# Patient Record
Sex: FEMALE | Race: WHITE
[De-identification: names, ages, dates, MRNs, and addresses within clinical notes are randomized per-mention and may not be internally consistent; named-entity substitution may affect disease eponyms.]

---

## 2019-06-13 ENCOUNTER — HOSPITAL ENCOUNTER (OUTPATIENT)
Dept: HOSPITAL 8 - CFH | Age: 59
Discharge: HOME | End: 2019-06-13
Attending: INTERNAL MEDICINE
Payer: OTHER GOVERNMENT

## 2019-06-13 DIAGNOSIS — I10: ICD-10-CM

## 2019-06-13 DIAGNOSIS — E78.5: ICD-10-CM

## 2019-06-13 DIAGNOSIS — I25.10: ICD-10-CM

## 2019-06-13 DIAGNOSIS — I07.1: Primary | ICD-10-CM

## 2019-06-13 PROCEDURE — 93306 TTE W/DOPPLER COMPLETE: CPT

## 2019-06-13 PROCEDURE — A9502 TC99M TETROFOSMIN: HCPCS

## 2019-06-13 PROCEDURE — 78452 HT MUSCLE IMAGE SPECT MULT: CPT

## 2019-06-13 PROCEDURE — 93017 CV STRESS TEST TRACING ONLY: CPT

## 2021-02-11 ENCOUNTER — HOSPITAL ENCOUNTER (OUTPATIENT)
Dept: RADIOLOGY | Facility: MEDICAL CENTER | Age: 61
End: 2021-02-11
Payer: OTHER GOVERNMENT

## 2021-02-24 ENCOUNTER — OFFICE VISIT (OUTPATIENT)
Dept: VASCULAR LAB | Facility: MEDICAL CENTER | Age: 61
End: 2021-02-24
Attending: INTERNAL MEDICINE
Payer: OTHER GOVERNMENT

## 2021-02-24 VITALS
DIASTOLIC BLOOD PRESSURE: 59 MMHG | HEIGHT: 64 IN | WEIGHT: 120.5 LBS | SYSTOLIC BLOOD PRESSURE: 99 MMHG | BODY MASS INDEX: 20.57 KG/M2 | HEART RATE: 59 BPM

## 2021-02-24 DIAGNOSIS — E78.41 ELEVATED LIPOPROTEIN(A): ICD-10-CM

## 2021-02-24 DIAGNOSIS — I25.83 CORONARY ARTERY DISEASE DUE TO LIPID RICH PLAQUE: ICD-10-CM

## 2021-02-24 DIAGNOSIS — I25.10 CORONARY ARTERY DISEASE DUE TO LIPID RICH PLAQUE: ICD-10-CM

## 2021-02-24 DIAGNOSIS — E78.5 HYPERLIPIDEMIA, UNSPECIFIED HYPERLIPIDEMIA TYPE: ICD-10-CM

## 2021-02-24 DIAGNOSIS — E78.5 HYPERLIPIDEMIA: ICD-10-CM

## 2021-02-24 DIAGNOSIS — J94.0 BILATERAL CHYLOTHORAX: ICD-10-CM

## 2021-02-24 DIAGNOSIS — R73.01 IFG (IMPAIRED FASTING GLUCOSE): ICD-10-CM

## 2021-02-24 PROCEDURE — 99213 OFFICE O/P EST LOW 20 MIN: CPT

## 2021-02-24 PROCEDURE — 99205 OFFICE O/P NEW HI 60 MIN: CPT | Performed by: INTERNAL MEDICINE

## 2021-02-24 RX ORDER — NIACIN 500 MG/1
2000 TABLET, EXTENDED RELEASE ORAL DAILY
Qty: 90 TABLET | Refills: 3
Start: 2021-02-24

## 2021-02-24 RX ORDER — EMPAGLIFLOZIN 10 MG/1
TABLET, FILM COATED ORAL
COMMUNITY

## 2021-02-25 NOTE — PROGRESS NOTES
INITIAL VASCULAR VISIT  Subjective:   Zoraida Hoover is a 60 y.o. 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  Has h/o dyslipidemia and htn,  Has had very high lp(a) discovered after MI -has a documented is high 600  Has been on repatha since may 2020  Takes niacin and estrogen  Also on rosuvastatin and ezetimibe  LDL-C has been very well controlled -almost undetectable  No history of hypertriglyceridemia  She had initially presented with some back pain and some shortness of breath.  She ended up needing a chest tube and having liters of fluid taken out of her thorax.  The consistency was consistent with chylothorax.  She did have mild elevations in lipase  No other cause of chylothorax was identified  Her blood pressures been under excellent control  Mother and sister have high lp(a)  chidren have not yet been checked.   She is very consistent with her medications  Currently taking 81 mg of aspirin  She is a former smoker    Past Medical History:   Diagnosis Date   • Anesthesia     Post op nausea   • Arthritis    • Chylothorax    • Hyperlipidemia    • Hypertension    • Myocardial infarct (HCC) 2004    cardiologist, Joyce De Oliveira   • Unspecified hemorrhagic conditions     taking Plavix and asa     Past Surgical History:   Procedure Laterality Date   • BREAST IMPLANT REVISION  4/25/2012    Performed by BRANNON GAUTHIER at SURGERY [facility]   • BREAST RECONSTRUCTION  4/25/2012    Performed by BRANNON GAUTHIER at [facility]   • ABDOMINOPLASTY  4/25/2012    Performed by BRANNON GAUTHIER at [facility]   • ABDOMINOPLASTY  11/23/2011    Performed by BRANNON GAUTHIER at [facility]   • LABIAPLASTY  11/23/2011    Performed by BRANNON GAUTHIER at [facility]   •  BLEPHAROPLASTY  3/7/2011    Performed by BRANNON GAUTHIER at [facility]ERY [facility]   • SHOULDER ARTHROSCOPY W/ ROTATOR CUFF REPAIR  2009    right   • MAMMOPLASTY AUGMENTATION      removed and replaced   • MAMMOPLASTY AUGMENTATION     • PRIMARY C SECTION         • FOOT SURGERY      right, fernández's neuroma   • RHINOPLASTY     • WI TRANSCATH STENT INIT VESSEL,PERCUT      CARIDAC X2     Family History   Problem Relation Age of Onset   • Heart Disease Father    • Hypertension Other      Social History     Tobacco Use   Smoking Status Former Smoker   • Packs/day: 0.30   • Years: 23.00   • Pack years: 6.90   • Quit date: 2003   • Years since quittin.1   Smokeless Tobacco Never Used     Social History     Tobacco Use   • Smoking status: Former Smoker     Packs/day: 0.30     Years: 23.00     Pack years: 6.90     Quit date: 2003     Years since quittin.1   • Smokeless tobacco: Never Used   Substance Use Topics   • Alcohol use: Yes     Comment: once a month   • Drug use: No     Outpatient Encounter Medications as of 2021   Medication Sig Dispense Refill   • Estradiol (ESTRACE PO) Take  by mouth.     • Empagliflozin (JARDIANCE) 10 MG Tab Take  by mouth.     • niacin SR (NIASPAN) 500 MG Tab CR Take 4 Tablets by mouth every day. 90 tablet 3   • Evolocumab, REPATHA, 140 MG/ML Solution Auto-injector Inject 1 Each under the skin every 14 days. 2 Each 11   • carvedilol (COREG) 6.25 MG TABS Take 1 Tab by mouth 2 times a day, with meals. 180 Each 3   • buPROPion SR (WELLBUTRIN-SR) 150 MG TB12 Take 300 mg by mouth 2 times a day.     • aspirin (ASA) 325 MG TABS Take 325 mg by mouth every day.     • [DISCONTINUED] Evolocumab (REPATHA SC) Inject  under the skin.     • rosuvastatin (CRESTOR) 40 MG tablet Take 1 Tab by mouth every day. 90 Tab 3   • [DISCONTINUED] ramipril (ALTACE) 5 MG CAPS Take 1 Cap by mouth every day. (Patient not taking: Reported on 2021) 90 Cap 11   •  [DISCONTINUED] clopidogrel (PLAVIX) 75 MG TABS Take 1 Tab by mouth every day. (Patient not taking: Reported on 2/24/2021) 90 Each 3   • [DISCONTINUED] niacin (NIASPAN) 1000 MG CR tablet Take 2 Tabs by mouth every evening. Take two tabs daily po (Patient not taking: Reported on 2/24/2021) 180 Each 11   • [DISCONTINUED] niacin SR (NIASPAN) 500 MG TBCR Take 1 Tab by mouth every evening. (Patient taking differently: Take 2,000 mg by mouth every evening.) 90 Each 11   • [DISCONTINUED] ezetimibe (ZETIA) 10 MG TABS Take 1 Tab by mouth every day. 90 Each 3   • PROGESTERONE 100 mg every day.     • [DISCONTINUED] estrogens conjugated, synthetic B, (ENJUVIA) 0.625 MG tablet Take 0.625 mg by mouth every day.     • [DISCONTINUED] Cholecalciferol (VITAMIN D) 2000 UNIT CAPS Take  by mouth every day.     • [DISCONTINUED] hydrocodone-acetaminophen (VICODIN) 5-500 MG TABS Take 1-2 Tabs by mouth every four hours as needed.     • [DISCONTINUED] Flaxseed, Linseed, (FLAX SEEDS PO) Take  by mouth every day.     • [DISCONTINUED] Non Formulary Request Harsha seeds po      • [DISCONTINUED] niacin (NIASPAN) 1000 MG CR tablet Take 2 Tabs by mouth every bedtime. PT TAKES 2500 MG DAILY 180 Tab 3   • [DISCONTINUED] ramipril (ALTACE) 5 MG CAPS Take 1 Cap by mouth every day. (Patient taking differently: Take 10 mg by mouth every day.) 90 Cap 3   • [DISCONTINUED] clopidogrel (PLAVIX) 75 MG TABS Take 1 Tab by mouth every day. (Patient not taking: Reported on 2/24/2021) 90 Each 3   • [DISCONTINUED] carvedilol (COREG) 6.25 MG TABS Take 1 Tab by mouth 2 times a day. (Patient not taking: Reported on 2/24/2021) 180 Each 3   • [DISCONTINUED] rosuvastatin (CRESTOR) 40 MG tablet Take 1 Tab by mouth every day. 90 Tab 3   • [DISCONTINUED] ezetimibe (ZETIA) 10 MG TABS Take 1 Tab by mouth every day. 90 Each 3   • [DISCONTINUED] Multiple Vitamin (MULTIVITAMIN PO) Take  by mouth every day.       No facility-administered encounter medications on file as of 2/24/2021.  "    No Known Allergies     DIET AND EXERCISE:  Weight Change: stable  Diet: Very heart healthy  Exercise: strenuous regular exercise program      Objective:     Vitals:    02/24/21 1527   BP: (!) 99/59   BP Location: Left arm   Patient Position: Sitting   BP Cuff Size: Adult   Pulse: (!) 59   Weight: 54.7 kg (120 lb 8 oz)   Height: 1.626 m (5' 4\")      Body mass index is 20.68 kg/m².  Physical Exam  Vitals reviewed.   Constitutional:       General: She is not in acute distress.     Appearance: Normal appearance. She is not ill-appearing.   HENT:      Head: Normocephalic and atraumatic.   Eyes:      General: No scleral icterus.     Extraocular Movements: Extraocular movements intact.   Neck:      Vascular: No carotid bruit.   Cardiovascular:      Rate and Rhythm: Normal rate and regular rhythm.      Pulses: Normal pulses.      Heart sounds: Normal heart sounds. No murmur.   Pulmonary:      Effort: Pulmonary effort is normal. No respiratory distress.      Breath sounds: Normal breath sounds.   Musculoskeletal:         General: No swelling.      Cervical back: Neck supple.   Skin:     Findings: No rash.   Neurological:      General: No focal deficit present.      Mental Status: She is alert and oriented to person, place, and time.      Cranial Nerves: No cranial nerve deficit.      Motor: No weakness.   Psychiatric:         Mood and Affect: Mood normal.         Behavior: Behavior normal.     Multiple imaging studies available in EMR and reviewed with patient at todays visit     [facility] lab May 2020  CRP 2.1  HDL 40, triglycerides 54, LDL 51,  LDL P6 88, small LDL P3 177  Apolipoprotein B 73  Lipoprotein a greater than 600                   Medical Decision Making:  Today's Assessment / Status / Plan:     1. Hyperlipidemia  niacin SR (NIASPAN) 500 MG Tab CR    Evolocumab, REPATHA, 140 MG/ML Solution Auto-injector   2. Coronary artery disease due to lipid rich plaque  Evolocumab, REPATHA, 140 MG/ML Solution " Auto-injector   3. IFG (impaired fasting glucose)     4. Bilateral chylothorax  DX-CHEST-2 VIEWS   5. Elevated lipoprotein(a)       Patient Type: Secondary Prevention    Etiology of Established CVD if Present: Coronary artery disease with MI and PCI in 2004    Lipid Management: Qualifies for Statin Therapy Based on 2018 ACC/AHA Guidelines: yes  Currently on Statin: Yes  Patient with coronary artery disease status post PCI and CABG 2004  Markedly high lipoprotein a levels -highest I have seen recorded  LDL-C suboptimally controlled for the clinical scenario on max dose rosuvastatin prior to the initiation of Repatha which she has been on now for several months  LDL-C and non-HDL under excellent control on her current therapy  Despite PCSK9 inhibitor, niacin and estrogen her lipoprotein a remains around 400 -which appears to be an indication for a pheresis  Plan:  -Continue rosuvastatin 40 mg daily  -Continue Repatha 140 mg every 2 weeks -[facility]  She will need a prior Auth.  We will have our staff look into that  -Hold ezetimibe for now  -Continue niacin ER 2000 mg daily for its effect on lipoprotein a  -I have already made increase with [employer] to see if we can get her to qualify further ongoing randomized clinical trial of an investigational anti-sense lp(a) drug  -We will begin initiating discussions with apheresis centers  -Recommend she has her children tested for elevated lipoprotein a and they would need a routine lipid panel as well    Blood Pressure Management  ACC-AHA blood pressure less than 130/80  Patient with excellent control both in the office and at home on current therapy  Plan:  -Continue ramipril and carvedilol current dose for now  -Continue home blood pressure monitoring     Glycemic Status: Prediabetic  -Continue Jardiance  -Repeat A1c in the future    Anti-Platelet/Anti-Coagulant Tx: yes  -Has lipoprotein a can be prothrombotic I recommended she  increase her aspirin to 325 mg daily    Smoking: Former smoker  -To complete cessation    Physical Activity: Continue excellent exercise habits    Weight Management and Nutrition: Continue heart healthy eating plan and maintenance of weight    Other:     1.  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  Case discussed at length with Dr. Quintana from [place] [facility] to confirm history.  They did a significant work-up for other potential causes of chylothorax and could not come up with any.  Although it is not reported in the literature to my knowledge, the working diagnosis is that her elevated lipoprotein a is the culprit  -Dr. Quintana will investigate as to whether or not lipoprotein electrophoresis was done as part of the flu evaluation  -We will repeat chest x-ray at Dr. Quintana's request  -If any recurrent effusion will need follow-up with surgery  -As above, if no other etiology is identified would consider this a complication of her exceedingly high lipoprotein a levels and an indication to pursue a pheresis    2. HRT -as we discussed at length, use of hormone placement therapy as a doubling sword in this case.  While it may have favorable effects on her lipid profile including lowering lipoprotein a, and patient established CAD it can be prothrombotic.  She understands risks and wishes to continue it for now    Instructed to follow-up with PCP for remainder of adult medical needs: yes  We will partner with other providers in the management of established vascular disease and cardiometabolic risk factors.    Studies to Be Obtained: Chest x-ray  Labs to Be Obtained: None currently    Follow up in: 3 weeks    Total time: 60-74min - chart review/prep, review of other providers' records, imaging/lab review, face-to-face time for history/examination, ordering, prescribing,  review of results/meds/ treatment plan with patient/family/caregiver,  documentation in EMR, care coordination (as needed)    Michael J Bloch, MD  Certified Clinical Lipid Specialist  Medial Director, [facility]    CC:  Dr. Mariano Lieberman

## 2021-02-26 ENCOUNTER — HOSPITAL ENCOUNTER (OUTPATIENT)
Dept: RADIOLOGY | Facility: MEDICAL CENTER | Age: 61
End: 2021-02-26
Attending: INTERNAL MEDICINE
Payer: OTHER GOVERNMENT

## 2021-02-26 ENCOUNTER — TELEPHONE (OUTPATIENT)
Dept: VASCULAR LAB | Facility: MEDICAL CENTER | Age: 61
End: 2021-02-26

## 2021-02-26 DIAGNOSIS — J94.0 BILATERAL CHYLOTHORAX: ICD-10-CM

## 2021-02-26 PROCEDURE — 71046 X-RAY EXAM CHEST 2 VIEWS: CPT

## 2021-02-26 NOTE — TELEPHONE ENCOUNTER
Spoke with patient to let her know Dr. Bloch wants her to get a chest x ray. Patient states that she will get that done.   She also states that her bp has been in the 70-80's systolic and is wondering if she should back off one of the bp meds temporarily.     Dr. Bloch would like patient to hold Ramipril at this time, patient is in agreement with plan.

## 2021-03-03 ENCOUNTER — TELEPHONE (OUTPATIENT)
Dept: VASCULAR LAB | Facility: MEDICAL CENTER | Age: 61
End: 2021-03-03

## 2021-03-03 NOTE — TELEPHONE ENCOUNTER
Spoke with patient  She is very interested in pursuing potential apheresis. If possible, she would like to pursue at [facility].   Msg sent to Dr. Aranda at [facility] to see how we can facilitate.    Michael J Bloch, MD  Certified Clinical Lipid Specialist  Medial Director, [facility]    Cc:   CATRINA Quintana

## 2021-03-15 ENCOUNTER — TELEPHONE (OUTPATIENT)
Dept: VASCULAR LAB | Facility: MEDICAL CENTER | Age: 61
End: 2021-03-15

## 2021-03-15 NOTE — TELEPHONE ENCOUNTER
Received documentation from [employer] that PA for Repatha was denied.  Appeal letter generated.  MA will submit along with supporting documents  Await further coverage determination    Michael J Bloch, MD  Certified Clinical Lipid Specialist  Medial Director, [facility]

## 2021-03-23 ENCOUNTER — TELEPHONE (OUTPATIENT)
Dept: VASCULAR LAB | Facility: MEDICAL CENTER | Age: 61
End: 2021-03-23

## 2021-03-23 NOTE — TELEPHONE ENCOUNTER
Received documentation from [employer] that Repatha was approved on appeal.  Medical assistant to instruct patient to go to the pharmacy to pick it up  I have been in discussions with EDWIGE Goff regarding possible a pheresis.  We will ask MA to set up a quick virtual follow-up visit for me to discuss with patient    Michael J Bloch, MD  Certified Clinical Lipid Specialist  Medial Director, [facility]

## 2021-03-24 ENCOUNTER — TELEPHONE (OUTPATIENT)
Dept: VASCULAR LAB | Facility: MEDICAL CENTER | Age: 61
End: 2021-03-24

## 2021-03-24 NOTE — TELEPHONE ENCOUNTER
Hello All,    I have contacted [email or web address] for a status update of the Appeal. The appeal has been approved in [employer] system, however [email or web address] has 21 days from 3/18/2021 to update their systems. As of today, [email or web address] system shows the appeal as Pending. I asked to see if this can be expedite and it can not. I was informed to check back in a few days to see if the system has updated.     Thank you,    [place]ma

## 2021-04-01 ENCOUNTER — OFFICE VISIT (OUTPATIENT)
Dept: VASCULAR LAB | Facility: MEDICAL CENTER | Age: 61
End: 2021-04-01
Attending: INTERNAL MEDICINE
Payer: OTHER GOVERNMENT

## 2021-04-01 DIAGNOSIS — J94.0 BILATERAL CHYLOTHORAX: ICD-10-CM

## 2021-04-01 DIAGNOSIS — E78.01 FAMILIAL HYPERCHOLESTEROLEMIA: ICD-10-CM

## 2021-04-01 DIAGNOSIS — E78.41 ELEVATED LIPOPROTEIN(A): ICD-10-CM

## 2021-04-01 DIAGNOSIS — I25.10 CORONARY ARTERY DISEASE DUE TO LIPID RICH PLAQUE: ICD-10-CM

## 2021-04-01 DIAGNOSIS — I25.83 CORONARY ARTERY DISEASE DUE TO LIPID RICH PLAQUE: ICD-10-CM

## 2021-04-01 PROCEDURE — 99214 OFFICE O/P EST MOD 30 MIN: CPT | Performed by: INTERNAL MEDICINE

## 2021-04-01 NOTE — PROGRESS NOTES
[facility] Follow up  April 1, 2021    Patient for f/u of high lp(a), FH, cad and chylothorax  Majority of visit spent on counseling and coordinating care    Has had no recurrent symptoms  cxr unremarkable.  repatha now much more affordable after PA  Good ahereeli with all medications.    We discussed apheresis. Although we cannot draw a direct causal link between her chylothorax and high lp(a) levels, there is no other obvious etiology and given her very high lp(a) levels and h/o CAD I do think that pursuing apheresis is worthwhile.    We discussed various apheresis locations, but my recommendation is that she [place]. HI Dennis [place] [facility] one of the country's leading experts in apheresis generally and high lp(a) specifically.     I sent Dr. Dennis and email to get in contact with patient and will continue to facilitate in any way that I can.    In the meantime she should continue her current care as outlined in my previous note and report any recurrent symptoms ASAP.     F/u in 2 months - virtual visit at her request    Time: 30-39min - chart review/prep, review of other providers' records, imaging/lab review, face-to-face time for history/examination, ordering, prescribing,  review of results/meds/ treatment plan with patient/family/caregiver, documentation in EMR, care coordination (as needed)    Michael J Bloch, MD  Certified Clinical Lipid Specialist  Medial Director, [facility]    Cc:   HI Tolbert

## 2021-06-01 ENCOUNTER — HOSPITAL ENCOUNTER (OUTPATIENT)
Dept: HOSPITAL 8 - CVU | Age: 61
Discharge: HOME | End: 2021-06-01
Attending: INTERNAL MEDICINE
Payer: OTHER GOVERNMENT

## 2021-06-01 DIAGNOSIS — I10: ICD-10-CM

## 2021-06-01 DIAGNOSIS — I25.10: ICD-10-CM

## 2021-06-01 DIAGNOSIS — I08.0: Primary | ICD-10-CM

## 2021-06-01 PROCEDURE — 93308 TTE F-UP OR LMTD: CPT

## 2021-06-01 PROCEDURE — 93321 DOPPLER ECHO F-UP/LMTD STD: CPT

## 2021-06-01 PROCEDURE — 93325 DOPPLER ECHO COLOR FLOW MAPG: CPT

## 2021-06-02 ENCOUNTER — OFFICE VISIT (OUTPATIENT)
Dept: VASCULAR LAB | Facility: MEDICAL CENTER | Age: 61
End: 2021-06-02
Attending: INTERNAL MEDICINE
Payer: OTHER GOVERNMENT

## 2021-06-02 DIAGNOSIS — E78.41 ELEVATED LIPOPROTEIN(A): ICD-10-CM

## 2021-06-02 DIAGNOSIS — I25.83 CORONARY ARTERY DISEASE DUE TO LIPID RICH PLAQUE: ICD-10-CM

## 2021-06-02 DIAGNOSIS — I25.10 CORONARY ARTERY DISEASE DUE TO LIPID RICH PLAQUE: ICD-10-CM

## 2021-06-02 DIAGNOSIS — J94.0 BILATERAL CHYLOTHORAX: ICD-10-CM

## 2021-06-02 DIAGNOSIS — E78.01 FAMILIAL HYPERCHOLESTEROLEMIA: ICD-10-CM

## 2021-06-02 PROCEDURE — 99214 OFFICE O/P EST MOD 30 MIN: CPT | Mod: GT,CR | Performed by: INTERNAL MEDICINE

## 2021-06-02 NOTE — PROGRESS NOTES
Follow up VASCULAR VISIT  Subjective:   Zoraida Hoover is a 60 y.o. female who presents today 21 for vascular follow-up  This visit was conducted via Zoom video call using secure and encrypted video conferencing technology due to covid restrictions.   The patient was in a private location in the state of [place].    The patient's identity was confirmed and verbal consent was obtained for this virtual visit.    HPI:  Here for f/u of dyslipidemia in the setting of CAD and recent hospitalization for chylothorax  On repatha - tolerating well  Remains on rosuvastatin-no myalgias  Still on niacin-no significant flushing  Has increased aspirin to 325 mg daily  Had follow-up blood work through PCP  Had an echocardiogram through her cardiologist  She did talk to a lipidologist in [place] and as well as with her local cardiologist but decided not to go through with apheresis at this time  No sob, chest pain or abd pain   BP at home always well less than 130/80  Tolerating current blood pressure medications    Family History   Problem Relation Age of Onset   • Heart Disease Father    • Hypertension Other      Social History     Tobacco Use   Smoking Status Former Smoker   • Packs/day: 0.30   • Years: 23.00   • Pack years: 6.90   • Quit date: 2003   • Years since quittin.4   Smokeless Tobacco Never Used     DIET AND EXERCISE:  Weight Change: stable  Diet: Very heart healthy  Exercise: strenuous regular exercise program      Objective:     There were no vitals filed for this visit.   There is no height or weight on file to calculate BMI.  Physical Exam  Constitutional:       General: She is not in acute distress.     Appearance: Normal appearance. She is not ill-appearing.   HENT:      Head: Normocephalic and atraumatic.   Eyes:      General: No scleral icterus.     Extraocular Movements: Extraocular movements intact.   Pulmonary:      Effort: Pulmonary effort is normal. No respiratory distress.   Skin:      Coloration: Skin is not pale.   Neurological:      General: No focal deficit present.      Mental Status: She is alert and oriented to person, place, and time.      Cranial Nerves: No cranial nerve deficit.      Motor: No weakness.   Psychiatric:         Mood and Affect: Mood normal.         Behavior: Behavior normal.     Multiple imaging studies available in EMR and reviewed with patient at todays visit     [place] heart lab May 2020  CRP 2.1  HDL 40, triglycerides 54, LDL 51,  LDL P6 88, small LDL P3 177  Apolipoprotein B 73  Lipoprotein a greater than 600                   Medical Decision Making:  Today's Assessment / Status / Plan:     1. Familial hypercholesterolemia     2. Coronary artery disease due to lipid rich plaque     3. Elevated lipoprotein(a)     4. Bilateral chylothorax       Patient Type: Secondary Prevention    Etiology of Established CVD if Present: Coronary artery disease with MI and PCI in 2004    Lipid Management: Qualifies for Statin Therapy Based on 2018 ACC/AHA Guidelines: yes  Currently on Statin: Yes  Patient with coronary artery disease status post PCI and CABG 2004  Markedly high lipoprotein a levels -highest I have seen recorded  LDL-C suboptimally controlled for the clinical scenario when on max dose rosuvastatin prior to the initiation of Repatha which she has been on now for several months  LDL-C and non-HDL under excellent control on her current therapy  Despite PCSK9 inhibitor, niacin and estrogen her lipoprotein a remains around 400 -which appears to be a potential indication for apheresis  Plan:  -Continue rosuvastatin 40 mg daily  -Continue Repatha 140 mg every 2 weeks  -Continue niacin ER 2000 mg daily for its effect on lipoprotein a  -After discussing with lipidologist in [facility] and local cardiologist she has decided to hold off on apheresis at this time - seems a reasonable decision.   -previously ecommend she has her children tested for elevated lipoprotein a and they would  need a routine lipid panel as well    Blood Pressure Management  ACC-AHA blood pressure less than 130/80  Patient with excellent control both in the office and at home on current therapy  Plan:  -Continue ramipril and carvedilol current dose for now  -Continue home blood pressure monitoring     Glycemic Status: Prediabetic  -Continue Jardiance  -Repeat A1c in the future    Anti-Platelet/Anti-Coagulant Tx: yes  -Has lipoprotein a, which can be prothrombotic.  I recommended she continue aspirin at a dose of 325 mg daily    Smoking: Former smoker  -Continue complete cessation    Physical Activity: Continue excellent exercise habits    Weight Management and Nutrition: Continue heart healthy eating plan and maintenance of weight    Other:     1.  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  Case discussed at length with Dr. Quintana from [place] [facility] to confirm history.  They did a significant work-up for other potential causes of chylothorax and could not come up with any.  Although it is not reported in the literature to my knowledge, the working diagnosis is that her elevated lipoprotein a is the culprit.  No known recurrence.  We will get a copy of her most recent echocardiogram.  Will defer any further indicated surveillance to her cardiologist    2. HRT -as we previously discussed at length, use of hormone placement therapy as a doubling sword in this case.  While it may have favorable effects on her lipid profile including lowering lipoprotein a, and patient established CAD it can be prothrombotic.  She understands risks and wishes to continue it for now    Instructed to follow-up with PCP for remainder of adult medical needs: yes  We will partner with other providers in the management of established vascular disease and cardiometabolic risk factors.    Studies to Be Obtained: Per [facility] -will ask staff to get copy of her most recent  echocardiogram from [place] [facility]    Labs to Be Obtained: Per [facility] -will ask staff to get copy of her most recent blood work from her PCP    Follow up in: AS NEEDED    Total time: 30-39 min - chart review/prep, review of other providers' records, imaging/lab review, face-to-face time for history/examination, ordering, prescribing,  review of results/meds/ treatment plan with patient/family/caregiver, documentation in EMR, care coordination (as needed)    Michael J Bloch, MD  Certified Clinical Lipid Specialist  Medial Director, [facility]    CC:  Dr. Mariano Tolbert

## 2021-06-04 DIAGNOSIS — I25.83 CORONARY ARTERY DISEASE DUE TO LIPID RICH PLAQUE: ICD-10-CM

## 2021-06-04 DIAGNOSIS — E78.5 HYPERLIPIDEMIA: ICD-10-CM

## 2021-06-04 DIAGNOSIS — I25.10 CORONARY ARTERY DISEASE DUE TO LIPID RICH PLAQUE: ICD-10-CM

## 2021-06-06 ENCOUNTER — DOCUMENTATION (OUTPATIENT)
Dept: VASCULAR LAB | Facility: MEDICAL CENTER | Age: 61
End: 2021-06-06

## 2021-06-06 NOTE — PROGRESS NOTES
The patient's plan does not require a PA at this time. Releasing Repatha 140mg/ml Solution Auto-Injector to [facility].

## 2021-06-08 DIAGNOSIS — I25.10 CORONARY ARTERY DISEASE DUE TO LIPID RICH PLAQUE: ICD-10-CM

## 2021-06-08 DIAGNOSIS — I25.83 CORONARY ARTERY DISEASE DUE TO LIPID RICH PLAQUE: ICD-10-CM

## 2021-06-08 DIAGNOSIS — E78.5 HYPERLIPIDEMIA: ICD-10-CM

## 2021-10-08 DIAGNOSIS — I25.10 CORONARY ARTERY DISEASE DUE TO LIPID RICH PLAQUE: ICD-10-CM

## 2021-10-08 DIAGNOSIS — I25.83 CORONARY ARTERY DISEASE DUE TO LIPID RICH PLAQUE: ICD-10-CM

## 2021-10-08 DIAGNOSIS — E78.5 HYPERLIPIDEMIA: ICD-10-CM

## 2021-10-08 RX ORDER — EVOLOCUMAB 140 MG/ML
INJECTION, SOLUTION SUBCUTANEOUS
Qty: 6 ML | Refills: 3 | Status: SHIPPED | OUTPATIENT
Start: 2021-10-08 | End: 2022-08-04 | Stop reason: SDUPTHER

## 2022-08-01 ENCOUNTER — DOCUMENTATION (OUTPATIENT)
Dept: VASCULAR LAB | Facility: MEDICAL CENTER | Age: 62
End: 2022-08-01
Payer: OTHER GOVERNMENT

## 2022-08-01 NOTE — PROGRESS NOTES
Received vm from patient wanting to schedule a f/u so she can get a refill on her [id number]. Call patient back to schedule appt and left vm.

## 2022-08-04 ENCOUNTER — OFFICE VISIT (OUTPATIENT)
Dept: VASCULAR LAB | Facility: MEDICAL CENTER | Age: 62
End: 2022-08-04
Attending: NURSE PRACTITIONER
Payer: OTHER GOVERNMENT

## 2022-08-04 ENCOUNTER — RESEARCH ENCOUNTER (OUTPATIENT)
Dept: OTHER | Facility: MEDICAL CENTER | Age: 62
End: 2022-08-04

## 2022-08-04 VITALS
HEART RATE: 71 BPM | SYSTOLIC BLOOD PRESSURE: 105 MMHG | BODY MASS INDEX: 20.51 KG/M2 | HEIGHT: 64 IN | DIASTOLIC BLOOD PRESSURE: 67 MMHG | WEIGHT: 120.1 LBS

## 2022-08-04 VITALS
DIASTOLIC BLOOD PRESSURE: 67 MMHG | SYSTOLIC BLOOD PRESSURE: 105 MMHG | HEART RATE: 71 BPM | BODY MASS INDEX: 20.51 KG/M2 | WEIGHT: 120.15 LBS | HEIGHT: 64 IN

## 2022-08-04 DIAGNOSIS — E78.2 MIXED HYPERLIPIDEMIA: ICD-10-CM

## 2022-08-04 DIAGNOSIS — I25.10 CORONARY ARTERY DISEASE DUE TO LIPID RICH PLAQUE: ICD-10-CM

## 2022-08-04 DIAGNOSIS — Z95.828 PRESENCE OF STENT IN ARTERY: ICD-10-CM

## 2022-08-04 DIAGNOSIS — I10 ESSENTIAL HYPERTENSION, BENIGN: ICD-10-CM

## 2022-08-04 DIAGNOSIS — E78.41 ELEVATED LIPOPROTEIN(A): ICD-10-CM

## 2022-08-04 DIAGNOSIS — E78.5 HYPERLIPIDEMIA: ICD-10-CM

## 2022-08-04 DIAGNOSIS — I25.83 CORONARY ARTERY DISEASE DUE TO LIPID RICH PLAQUE: ICD-10-CM

## 2022-08-04 DIAGNOSIS — I25.2 OLD INFERIOR WALL MYOCARDIAL INFARCTION: ICD-10-CM

## 2022-08-04 PROCEDURE — 99214 OFFICE O/P EST MOD 30 MIN: CPT | Performed by: NURSE PRACTITIONER

## 2022-08-04 PROCEDURE — 99212 OFFICE O/P EST SF 10 MIN: CPT

## 2022-08-04 RX ORDER — EVOLOCUMAB 140 MG/ML
1 INJECTION, SOLUTION SUBCUTANEOUS
Qty: 6 EACH | Refills: 3 | Status: SHIPPED | OUTPATIENT
Start: 2022-08-04 | End: 2023-07-27

## 2022-08-04 RX ORDER — FLUOXETINE HYDROCHLORIDE 20 MG/1
20 CAPSULE ORAL DAILY
COMMUNITY

## 2022-08-04 RX ORDER — EZETIMIBE 10 MG/1
10 TABLET ORAL DAILY
COMMUNITY

## 2022-08-04 NOTE — PROGRESS NOTES
"  Follow up VASCULAR VISIT  Subjective:   Zoraida Hoover is a 60 y.o. 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  Physical Exam  Constitutional:       General: She is not in acute distress.     Appearance: Normal appearance. She is not ill-appearing.   HENT:      Head: Normocephalic and atraumatic.   Eyes:      General: No scleral icterus.     Extraocular Movements: Extraocular movements intact.   Pulmonary:      Effort: Pulmonary effort is normal. No respiratory distress.   Skin:     Coloration: Skin is not pale.   Neurological:      General: No focal deficit present.      Mental Status: She is alert and oriented to person, place, and time.      Cranial Nerves: No cranial nerve deficit.      Motor: No weakness.   Psychiatric:         Mood and Affect: Mood normal.         Behavior: Behavior normal.     Multiple imaging studies available in EMR and reviewed with patient at todays visit     [facility] " heart lab May 2020  CRP 2.1  HDL 40, triglycerides 54, LDL 51,  LDL P6 88, small LDL P3 177  Apolipoprotein B 73  Lipoprotein a greater than 600    Medical Decision Making:  Today's Assessment / Status / Plan:     1. Elevated lipoprotein(a)     2. Presence of stent in artery     3. Old inferior wall myocardial infarction     4. Mixed hyperlipidemia     5. Essential hypertension, benign     6. Hyperlipidemia  Evolocumab, REPATHA, (REPATHA SURECLICK) 140 MG/ML Solution Auto-injector   7. Coronary artery disease due to lipid rich plaque  Evolocumab, REPATHA, (REPATHA SURECLICK) 140 MG/ML Solution Auto-injector     Patient Type: Secondary Prevention    Etiology of Established CVD if Present: Coronary artery disease with MI and PCI in 2004    Lipid Management: Qualifies for Statin Therapy Based on 2018 ACC/AHA Guidelines: yes  Currently on Statin: Yes  Patient with coronary artery disease status post PCI and CABG 2004  Markedly high lipoprotein a levels -highest I have seen recorded  LDL-C suboptimally controlled for the clinical scenario when on max dose rosuvastatin prior to the initiation of Repatha which she has been on now for 1 year   LDL-C and non-HDL under excellent control on her current therapy  Despite PCSK9 inhibitor, niacin and estrogen her lipoprotein a remains around 400 -which appears to be a potential indication for apheresis  Lp(a) currently 344  LDL 21, nonHDL 30  Plan:  -Continue rosuvastatin 40 mg daily  -Continue Repatha 140 mg every 2 weeks  -Continue niacin ER 2000 mg daily for its effect on lipoprotein a  -Continue Zetia  - Previously recommend she has her children tested for elevated lipoprotein a and they would need a routine lipid panel as well    Blood Pressure Management  ACC-AHA blood pressure less than 130/80  Patient with excellent control both in the office and at home on current therapy  Plan:  -Continue carvedilol current dose for now  -Continue home blood pressure monitoring      Glycemic Status: Prediabetic  -Continue Jardiance  -Repeat A1c in the future    Anti-Platelet/Anti-Coagulant Tx: yes  -High lipoprotein a, which can be prothrombotic.  I recommended she continue aspirin at a dose of 325 mg daily    Smoking: Former smoker  -Continue complete cessation    Physical Activity: Continue excellent exercise habits    Weight Management and Nutrition: Continue heart healthy eating plan and maintenance of weight    Other:     1.  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  Case discussed at length with Dr. Quintana from [place] [facility] to confirm history.  They did a significant work-up for other potential causes of chylothorax and could not come up with any.  Although it is not reported in the literature to my knowledge, the working diagnosis is that her elevated lipoprotein a is the culprit.  No known recurrence.  Will defer any further indicated surveillance to her cardiologist    2. HRT -as we previously discussed at length, use of hormone placement therapy as a doubling sword in this case.  While it may have favorable effects on her lipid profile including lowering lipoprotein a, and patient established CAD it can be prothrombotic.  She understands risks and wishes to continue it for now    Instructed to follow-up with PCP for remainder of adult medical needs: yes  We will partner with other providers in the management of established vascular disease and cardiometabolic risk factors.    Studies to Be Obtained: Per [facility]    Labs to Be Obtained: Per [facility] -will ask staff to get copy of her most recent blood work from her PCP    Follow up in: 1 year or sooner if needed     Emily CHÁVEZ  [facility]    CC:  Dr. Mariano Tolbert

## 2022-08-04 NOTE — RESEARCH NOTE
"Name: Zoraida Hoover   MRN: [id number]  Participant ID:  [id number]  : 1960  Visit Date/Time: 2022 11:26 AM  Who is present: Bella Landaverde    Study:  [id number] - Lp(a) Phase 0   Status Consented/Enrolled   Start Date 22   Participant ID [id number]   Coordinator , Bella ROBERTSON; Bella Landaverde; Katiuska Leyva   NCT [id number]    Michael J Bloch, M.D.     I had the pleasure of speaking with Zoraida today about the Lp(a) study. She is very interested in the next phase, she signed consent to participate in phase 0 and to be reached out to for future studies.   Zoraida did decline the stipend today. She meets all inclusions for the study and no exclusions. She had an LP(a) level drawn 21 = 399, results are in media. She had an MI 3/27/04, she follows [facility] cardiology note in media 10/11/21.        Vitals:    Vitals:    22 1123   BP: 105/67   Pulse: 71   Weight: 54.5 kg (120 lb 2.4 oz)   Height: 1.626 m (5' 4.02\")         "

## 2022-08-05 ENCOUNTER — DOCUMENTATION (OUTPATIENT)
Dept: VASCULAR LAB | Facility: MEDICAL CENTER | Age: 62
End: 2022-08-05
Payer: OTHER GOVERNMENT

## 2023-01-23 ENCOUNTER — RESEARCH ENCOUNTER (OUTPATIENT)
Dept: VASCULAR LAB | Facility: MEDICAL CENTER | Age: 63
End: 2023-01-23
Attending: INTERNAL MEDICINE
Payer: OTHER GOVERNMENT

## 2023-01-23 VITALS
BODY MASS INDEX: 20.47 KG/M2 | WEIGHT: 119.93 LBS | TEMPERATURE: 97.8 F | HEIGHT: 64 IN | RESPIRATION RATE: 16 BRPM | SYSTOLIC BLOOD PRESSURE: 94 MMHG | DIASTOLIC BLOOD PRESSURE: 66 MMHG | OXYGEN SATURATION: 98 % | HEART RATE: 56 BPM

## 2023-01-23 NOTE — RESEARCH NOTE
"Name: Zoraida Hoover   MRN: [id number]  Participant ID:  [id number]  : 1960  Visit Date/Time: 2023 10:28 AM  Who is present: Bella Landaverde    Study:    [id number] - [id number]   Status Consented/Enrolled (2023)   Active Start Date 23   Participant    Coordinator Bella Landaverde; Bella Chaney; Katiuska Leyva   IRB [id number]   [facility] [id number]    Michael J Bloch, M.D.     Note:  Screening/Consent note:    Participation in the [place] clinical trial was discussed with Zoraida today. All aspects of the study purpose and procedures were explained. She was given ample time to review the consent and all questions were answered to her satisfaction. Patient aware that the clinical trial is voluntary and she may withdraw consent at any time without affecting the level of care they receive.  Subject signed consent without coercion and undue influence and was given a copy of the signed consent. No study-related procedures took place prior to consenting and all assessments were conducted per protocol.    No AE/SINDHU to report     Vitals:    Vitals:    23 1026   BP: 94/66   Pulse: (!) 56   Resp: 16   Temp: 36.6 °C (97.8 °F)   SpO2: 98%   Weight: 54.4 kg (119 lb 14.9 oz)   Height: 1.626 m (5' 4.02\")   Waist: 28 in  Hip 31.5 in    Labs: Fasting   Drawn @ 8:21    LP(a) screening  lab            Screening labs   Hematology  Lipid  Cholesterol     Meds:  Meds reviewed no changes from last visit.     "

## 2023-02-16 ENCOUNTER — RESEARCH ENCOUNTER (OUTPATIENT)
Dept: VASCULAR LAB | Facility: MEDICAL CENTER | Age: 63
End: 2023-02-16

## 2023-02-16 ENCOUNTER — OFFICE VISIT (OUTPATIENT)
Dept: VASCULAR LAB | Facility: MEDICAL CENTER | Age: 63
End: 2023-02-16
Attending: INTERNAL MEDICINE
Payer: OTHER GOVERNMENT

## 2023-02-16 VITALS
OXYGEN SATURATION: 97 % | DIASTOLIC BLOOD PRESSURE: 79 MMHG | HEIGHT: 64 IN | RESPIRATION RATE: 18 BRPM | BODY MASS INDEX: 20.49 KG/M2 | WEIGHT: 120 LBS | HEART RATE: 77 BPM | SYSTOLIC BLOOD PRESSURE: 115 MMHG

## 2023-02-16 VITALS — BODY MASS INDEX: 20.58 KG/M2 | WEIGHT: 119.93 LBS

## 2023-02-16 DIAGNOSIS — I25.10 CORONARY ARTERY DISEASE DUE TO LIPID RICH PLAQUE: ICD-10-CM

## 2023-02-16 DIAGNOSIS — E78.41 ELEVATED LIPOPROTEIN(A): ICD-10-CM

## 2023-02-16 DIAGNOSIS — I25.83 CORONARY ARTERY DISEASE DUE TO LIPID RICH PLAQUE: ICD-10-CM

## 2023-02-16 PROCEDURE — 99999 PR NO CHARGE: CPT | Performed by: INTERNAL MEDICINE

## 2023-02-16 NOTE — RESEARCH NOTE
"Name: Zoraida Hoover   MRN: [id number]  Participant ID:  [id number]  : 1960  Visit Date/Time: 2023 2:30 PM  Who is present: Bella Landaverde    Study:    [facility]_[id number] - [id number]   Status Consented/Enrolled (2023)   Active Start Date 23   Participant    Coordinator Bella Landaverde; Bella Chaney; Katiuska Leyva   IRB [id number]   [facility] [id number]    Michael J Bloch, M.D.     Study Note:        Zoraida came in today for Randomization Day 1. Prior to beginning study-related procedures she was re-consented to ICF version 3. Zoraida was informed of the changes in the new ICF, she signed consent without coercion and undue influence and was given a copy of the signed consent. After reviewing the new ICF and study requirements she meets all inclusion criteria and no exclusions. Zoraida forgot she needed to be fasting for labs today, so we re-reviewed that all labs throughout the study are fasting labs. I reviewed with Zoraida that Dr. Bloch, myself and the rest of the study team will be blinded from central labs moving forward, myself and Dr. Bloch also reiterated that if she has her lipid or Lp(a) labs drawn that she cannot inform us of the results. All study procedures started after PI assessment and enrollment decision was made. After subject was cleared we began study procedures listed below.    Study drug was given @ 9:47 on the left side of her stomach, she was assigned box [id number]. Zoraida waited with me for 30 minutes after drug was given. No AE/SINDHU occurred.   Patient waited another 30 minutes with me to complete PK draw on same day. Please see PI note for assessment.          Vitals: 8:50    Vitals:    23 1427   BP: 115/79   BP Location: Right arm   Patient Position: Sitting   Pulse: 77   Resp: 18   SpO2: 97%   Weight: 54.4 kg (120 lb)   Height: 1.626 m (5' 4.02\")       Labs: Drawn @9:24 Patient was Not Fasting                Day 1 randomization labs   PG " sample  PK lab drawn @ 10:47    Meds:    No changes to medication from screening visit     Adverse Events:     No AE/SINDHU to report      Tobacco History     Type: Cigarettes  Use: Former  Quantity 6 packs  Frequency: Everyday  Duration: 23 years  Quit: 1/1/2003      EKG: Completed @ 9:40    Completed and signed off by PI        Follow-up: 3/16 @ 8:30 for week 4 labs

## 2023-02-16 NOTE — PROGRESS NOTES
OCEAN(A) STUDY VISIT    Patient presents today for day 1 visit of the ocean a outcome study    Patient confirms history of MI and coronary stent placement as a qualifying event.  His lipoprotein a level at screening was appropriate for inclusion.  I confirmed the patient meets all inclusion criteria and no exclusion criteria    I reviewed the mechanism of action of study drug and the basic study design.  Patient signed informed consent, but I again reviewed the major components of the informed consent document including the potential risks of study drug and participation.  I highlighted the fact that he has a 50% chance of receiving placebo and that enrollment is making a commitment to multiyear study.  I reminded patient that they should not change background lipid-lowering medications without discussing with our study team, and she should immediately contact us with any cardiovascular or adverse events including hospitalization    All patient's questions were answered and he wishes to continue    I gave a verbal order to the research pharmacist to dispense first dose study medication    Physical Exam  Vitals reviewed.   Constitutional:       General: She is not in acute distress.     Appearance: She is not diaphoretic.   HENT:      Head: Normocephalic and atraumatic.   Eyes:      General: No scleral icterus.     Conjunctiva/sclera: Conjunctivae normal.   Neck:      Vascular: No carotid bruit.   Cardiovascular:      Rate and Rhythm: Normal rate and regular rhythm.      Heart sounds: Normal heart sounds. No murmur heard.  Pulmonary:      Effort: Pulmonary effort is normal. No respiratory distress.      Breath sounds: Normal breath sounds. No wheezing or rales.   Musculoskeletal:      Right lower leg: No edema.      Left lower leg: No edema.   Skin:     Coloration: Skin is not pale.   Neurological:      General: No focal deficit present.      Mental Status: She is alert and oriented to person, place, and time.       Cranial Nerves: No cranial nerve deficit.      Coordination: Coordination normal.      Gait: Gait is intact. Gait normal.   Psychiatric:         Mood and Affect: Mood and affect normal.         Behavior: Behavior normal.       Patient will be given instructions for follow up visit per protocol    Michael J Bloch, MD       Cc:   LENO Quintana

## 2023-03-16 ENCOUNTER — RESEARCH ENCOUNTER (OUTPATIENT)
Dept: VASCULAR LAB | Facility: MEDICAL CENTER | Age: 63
End: 2023-03-16
Attending: INTERNAL MEDICINE
Payer: OTHER GOVERNMENT

## 2023-03-16 VITALS
OXYGEN SATURATION: 98 % | WEIGHT: 119.93 LBS | RESPIRATION RATE: 18 BRPM | TEMPERATURE: 97 F | SYSTOLIC BLOOD PRESSURE: 108 MMHG | HEART RATE: 70 BPM | BODY MASS INDEX: 20.47 KG/M2 | HEIGHT: 64 IN | DIASTOLIC BLOOD PRESSURE: 70 MMHG

## 2023-03-16 NOTE — RESEARCH NOTE
"Name: Zoraida Hoover   MRN: [id number]  Participant ID:  [id number]  : 1960  Visit Date/Time: 3/16/2023 11:12 AM  Who is present: Bella Landaverde    Study:    [facility]_[id number] - [id number]   Status Consented/Enrolled (2023)   Active Start Date 23   Participant    Coordinator Bella Landaverde   IRB [id number]   Replaced by [facility] [id number]    Michael J Bloch, M.D.     Note:    I saw Zoraida today for her week 4 lab draw visit, patient is doing well. She has no questions or changes in treatment that need to be addressed, she is excited for her next treatment visit. Lab draw went well no issues or AE/SINDHU to report. All procedures were completed per protocol    Vitals:    Vitals:    23 1111   BP: 108/70   Pulse: 70   Resp: 18   Temp: 36.1 °C (97 °F)   SpO2: 98%   Weight: 54.4 kg (119 lb 14.9 oz)   Height: 1.626 m (5' 4.02\")       Labs:                  Fasting Labs - Drawn @ 8:49  Week 4 lab kit drawn      Meds:    No changes to medications since previous visit     Adverse Events: No AE/SINDHU to report        Social History     Tobacco Use    Smoking status: Former     Packs/day: 0.30     Years: 23.00     Pack years: 6.90     Types: Cigarettes     Quit date: 2003     Years since quittin.2    Smokeless tobacco: Never   Substance Use Topics    Alcohol use: Yes     Comment: once a month    Drug use: No           Follow-up: 23 Week 12 visit       "

## 2023-05-11 ENCOUNTER — RESEARCH ENCOUNTER (OUTPATIENT)
Dept: CARDIOLOGY | Facility: MEDICAL CENTER | Age: 63
End: 2023-05-11
Payer: OTHER GOVERNMENT

## 2023-05-11 ENCOUNTER — APPOINTMENT (OUTPATIENT)
Dept: VASCULAR LAB | Facility: MEDICAL CENTER | Age: 63
End: 2023-05-11
Attending: INTERNAL MEDICINE
Payer: OTHER GOVERNMENT

## 2023-05-11 VITALS
RESPIRATION RATE: 16 BRPM | WEIGHT: 119.93 LBS | HEIGHT: 64 IN | DIASTOLIC BLOOD PRESSURE: 65 MMHG | SYSTOLIC BLOOD PRESSURE: 105 MMHG | BODY MASS INDEX: 20.47 KG/M2 | HEART RATE: 52 BPM

## 2023-05-11 NOTE — RESEARCH NOTE
"Name: Zoraida Hoover   MRN: [id number]  Participant ID:  [id number]  : 1960  Visit Date/Time: 2023 11:53 AM  Who is present: Bella Landaverde    Study:    [facility]_[id number] - [id number]   Status Consented/Enrolled (2023)   Active Start Date 23   Participant    Coordinator Bella Landaverde   [facility] [id number]   [facility] [id number]    Michael J Bloch, M.D.     Study Note:     Zoraida came in today for week 12 visit. Patient meets all inclusion criteria and no exclusions, I reminded Zoraida if she has her lipids drawn to make sure she does not let us know the results. I reiterated with her to call me if she has any symptoms or feels she is having a side affect from her injection today. Study procedures were performed per protocol listed below.    Study drug was given @ 8:49 on the Left side of her stomach patient assigned pack [id number], she waited 30 minutes after drug was given with me. Patient waited another 30 min to complete PK draw on same day @ 9:49.            Vitals:    Vitals:    23 0800 23 1152   BP:  105/65   Pulse:  (!) 52   Resp:  16   Weight: 54.4 kg (119 lb 14.9 oz) 54.4 kg (119 lb 14.9 oz)   Height:  1.626 m (5' 4.02\")       Labs:  Fasting labs drawn @ 8:43    Week 12 kit                PK drawn at 9:49    Meds:  No changes to current medication    Adverse Events: No AE/SINDHU          Social History     Tobacco Use    Smoking status: Former     Packs/day: 0.30     Years: 23.00     Pack years: 6.90     Types: Cigarettes     Quit date: 2003     Years since quittin.3    Smokeless tobacco: Never   Substance Use Topics    Alcohol use: Yes     Comment: once a month    Drug use: No           Follow-up: TBD      "

## 2023-07-27 ENCOUNTER — OFFICE VISIT (OUTPATIENT)
Dept: VASCULAR LAB | Facility: MEDICAL CENTER | Age: 63
End: 2023-07-27
Attending: INTERNAL MEDICINE
Payer: OTHER GOVERNMENT

## 2023-07-27 ENCOUNTER — RESEARCH ENCOUNTER (OUTPATIENT)
Dept: VASCULAR LAB | Facility: MEDICAL CENTER | Age: 63
End: 2023-07-27

## 2023-07-27 VITALS
WEIGHT: 119.93 LBS | SYSTOLIC BLOOD PRESSURE: 117 MMHG | BODY MASS INDEX: 20.47 KG/M2 | HEART RATE: 58 BPM | HEIGHT: 64 IN | DIASTOLIC BLOOD PRESSURE: 73 MMHG | RESPIRATION RATE: 18 BRPM

## 2023-07-27 DIAGNOSIS — E78.41 ELEVATED LIPOPROTEIN(A): ICD-10-CM

## 2023-07-27 DIAGNOSIS — E78.5 HYPERLIPIDEMIA: ICD-10-CM

## 2023-07-27 DIAGNOSIS — I25.83 CORONARY ARTERY DISEASE DUE TO LIPID RICH PLAQUE: ICD-10-CM

## 2023-07-27 DIAGNOSIS — I25.10 CORONARY ARTERY DISEASE DUE TO LIPID RICH PLAQUE: ICD-10-CM

## 2023-07-27 PROCEDURE — 99213 OFFICE O/P EST LOW 20 MIN: CPT | Performed by: INTERNAL MEDICINE

## 2023-07-27 RX ORDER — EVOLOCUMAB 140 MG/ML
140 INJECTION, SOLUTION SUBCUTANEOUS
Qty: 6 EACH | Refills: 3 | Status: SHIPPED | OUTPATIENT
Start: 2023-07-27

## 2023-07-27 NOTE — RESEARCH NOTE
"Name: Zoraida Hoover   MRN: [id number]  Participant ID:  [id number]  : 1960  Visit Date/Time: 2023 1:03 PM  Who is present: Bella Landaverde    Study:    [id number] - [id number]   Status Consented/Enrolled (2023)   Active Start Date 23   Participant    Coordinator Bella Landaverde; Bella Chaney; Katiuska Leyva   IRB [id number]   [facility] [id number]    Michael J Bloch, M.D.         Study Note:     Zoraida came in today for week 24 visit. We reviewed and signed consent version 5. I reminded the patient that the study team is still blinded to all lipid results until the end of the study and all monitoring of lipids need to be completed by non study team providers. Patient is doing well no changes in medical history, no issues to report at this time. Study procedures were performed per protocol listed below. Please see provider note for assessment.     Study drug was given @ 8:56am in left side of abdomen patient assigned pack [id number], she waited 30 minutes after drug was given with me. Patient waited another 30 min to complete PK draw on same day.       Vitals:    Vitals:    23 0832   BP: 117/73   Pulse: (!) 58   Resp: 18   Weight: 54.4 kg (119 lb 14.9 oz)   Height: 1.626 m (5' 4.02\")       Labs:  fasting labs drawn @ 8:50                Week 24 kit used  PK drawn @ 9:56am      Meds:  Reviewed no changes at this time      Adverse Events: No/AE/SINDHU to report              Procedures:     EKG: Completed given to PI for sign off. No changes from previous EKG          Follow-up: 10/17 @ 8:30    "

## 2023-07-27 NOTE — PROGRESS NOTES
[facility] Study Follow up     Patient here for [facility] study follow-up    She has no complaints.  Feels well.  Continues to exercise avidly.  She has great exercise tolerance.    Physical Exam  Vitals reviewed.   Constitutional:       General: She is not in acute distress.     Appearance: She is not diaphoretic.   HENT:      Head: Normocephalic and atraumatic.   Eyes:      General: No scleral icterus.     Conjunctiva/sclera: Conjunctivae normal.   Neck:      Vascular: No carotid bruit.   Cardiovascular:      Rate and Rhythm: Normal rate and regular rhythm.      Heart sounds: Normal heart sounds. No murmur heard.  Pulmonary:      Effort: Pulmonary effort is normal. No respiratory distress.      Breath sounds: Normal breath sounds. No wheezing or rales.   Musculoskeletal:      Right lower leg: No edema.      Left lower leg: No edema.   Skin:     Coloration: Skin is not pale.   Neurological:      General: No focal deficit present.      Mental Status: She is alert and oriented to person, place, and time.      Cranial Nerves: No cranial nerve deficit.      Coordination: Coordination normal.      Gait: Gait is intact. Gait normal.   Psychiatric:         Mood and Affect: Mood and affect normal.         Behavior: Behavior normal.       She had no further questions or concerns    Follow-up per study protocol    I did send in a refill for her Repatha to [facility]    Michael J Bloch, MD  Certified Clinical Lipid Specialist  Medial Director, [facility]

## 2023-08-04 VITALS — BODY MASS INDEX: 20.58 KG/M2 | WEIGHT: 119.93 LBS

## 2023-08-22 ENCOUNTER — TELEPHONE (OUTPATIENT)
Dept: PHARMACY | Facility: MEDICAL CENTER | Age: 63
End: 2023-08-22
Payer: OTHER GOVERNMENT

## 2023-08-22 NOTE — TELEPHONE ENCOUNTER
Called [facility] at [phone], s/w Vivi, to check on PA status. Answered clinical questions over the phone that were missing.     Renewal prior authorization for Repatha SureClick 140 mg/ml Sol Auto-inj    for quantity of 2 mls for a day supply of 28 has been APPROVED.    Insurance- [facility]    Reference#-?[id number]    Dates in effect, from 07/21/23 through 08/24/24    Pharmacy and phone number   [facility] - [place], [place] - [address] [place] [address] pay- $38  **max of 30 days at [employer]    FA-[facility] patient    The patient can fill with [facility] or we will release to the patient's preferred pharmacy. The team will provide outreach to the patient and offer clinical services.    Approval Letter   Verbal approval from Vivi at [facility]. Will attach approval letter once faxed comes through.

## 2023-08-22 NOTE — TELEPHONE ENCOUNTER
New PA submitted on 8/18/2023. PA case ID: [id number], KEY: [id number]. update: PENDING. will follow up within 24-48 hours

## 2023-09-06 ENCOUNTER — TELEPHONE (OUTPATIENT)
Dept: VASCULAR LAB | Facility: MEDICAL CENTER | Age: 63
End: 2023-09-06
Payer: OTHER GOVERNMENT

## 2023-09-06 NOTE — TELEPHONE ENCOUNTER
----- Message from Skye Noguera, PhT sent at 9/5/2023  3:00 PM PDT -----  Regarding: RE: kermit for repatha  Hello,     Per [facility] pharmacy, Pt received a 3-month supply of her Repatha on 8/29/2023 @ 12:03PM via [employer]. Next refill date won't be until 10/23/23.       Thank you   ----- Message -----  From: Jayme Bliss, PharmD  Sent: 9/5/2023  12:42 PM PDT  To: Skye Noguera, PhT; #  Subject: RE: kermit for repatha                           Hi Skye!  Any chance someone from your team could reach out to make sure they got the PCSK9?    Thanks!    ----- Mess

## 2023-11-02 ENCOUNTER — RESEARCH ENCOUNTER (OUTPATIENT)
Dept: VASCULAR LAB | Facility: MEDICAL CENTER | Age: 63
End: 2023-11-02
Attending: INTERNAL MEDICINE
Payer: OTHER GOVERNMENT

## 2023-11-02 VITALS
BODY MASS INDEX: 20.47 KG/M2 | HEART RATE: 65 BPM | SYSTOLIC BLOOD PRESSURE: 131 MMHG | HEIGHT: 64 IN | DIASTOLIC BLOOD PRESSURE: 87 MMHG | WEIGHT: 119.93 LBS | TEMPERATURE: 96.8 F | RESPIRATION RATE: 16 BRPM | OXYGEN SATURATION: 97 %

## 2023-11-02 NOTE — RESEARCH NOTE
"Name: Zoraida Hoover   MRN: [id number]  Participant ID:  [id number]  : 1960  Visit Date/Time: 2023 4:17 PM  Who is present: Bella Landaverde    Study:    [facility]_[id number] - [id number]   Status Consented/Enrolled (2023)   Active Start Date 23   Participant    Coordinator Bella Landaverde; Bella Chaney; Katiuska Leyva   IRB [id number]   [facility] [id number]    Michael J Bloch, M.D.       Vitals:    Vitals:    23 0836   BP: 131/87   Pulse: 65   Resp: 16   Temp: 36 °C (96.8 °F)   SpO2: 97%   Weight: 54.4 kg (119 lb 14.9 oz)   Height: 1.626 m (5' 4.02\")     Study Note:     Zoraida came in today for Week 36 visit. I reminded the patient the study team is to remain blinded to all lipid results until the end of the study. All monitoring of lipids need to be completed and reviewed by non study team providers. Study procedures were performed per protocol listed below. Patient doing well no issues to report at this time    Study drug     Box pulled @ 8:36    Injection was given @ 9:08am in Left stomach    assigned pack [id number], Lot # [id number]  Patient was observed 30 minutes after drug was given with coordinator.        Adverse Events:      No AE/SINDHU to report        Labs:                  Fasting Labs - drawn @ 8:50  Week 36 lab kit      No PK labs at this visit       Meds:     Meds reviewed - No changes               Follow-up: TBD- Patient out of town from Dec to           "

## 2024-01-11 ENCOUNTER — OFFICE VISIT (OUTPATIENT)
Dept: VASCULAR LAB | Facility: MEDICAL CENTER | Age: 64
End: 2024-01-11
Attending: INTERNAL MEDICINE
Payer: OTHER GOVERNMENT

## 2024-01-11 VITALS
BODY MASS INDEX: 20.32 KG/M2 | WEIGHT: 119 LBS | HEART RATE: 64 BPM | HEIGHT: 64 IN | DIASTOLIC BLOOD PRESSURE: 70 MMHG | SYSTOLIC BLOOD PRESSURE: 109 MMHG

## 2024-01-11 DIAGNOSIS — E78.5 HYPERLIPIDEMIA, UNSPECIFIED HYPERLIPIDEMIA TYPE: ICD-10-CM

## 2024-01-11 PROCEDURE — 3074F SYST BP LT 130 MM HG: CPT | Performed by: INTERNAL MEDICINE

## 2024-01-11 PROCEDURE — 3078F DIAST BP <80 MM HG: CPT | Performed by: INTERNAL MEDICINE

## 2024-01-11 PROCEDURE — 99213 OFFICE O/P EST LOW 20 MIN: CPT | Performed by: INTERNAL MEDICINE

## 2024-01-11 NOTE — RESEARCH NOTE
"Name: Zoraida Hoover   MRN: [id number]  Participant ID:  [id number]  : 1960  Visit Date/Time: 2024 11:37 AM      Study:    [facility]_[id number] - [id number]   Status Consented/Enrolled (2023)   Active Start Date 23   Participant    Coordinator Bella Landaverde; Bella Chaney; Katiuska Leyva   IRB [id number]   [facility] [id number]    Michael J Bloch, M.D.       Assessment and Plan:   Saw  Zoraida waters [place] Study Week 48 today 2024. Vitals were done at 08:55 not 08:05. EKG done and reviewed by Dr Bloch. No AE/SINDHU/deviations to report at this visit. Medications were reviewed and no changes. Lab work was done and processed according to protocol. Dr Bloch preformed the physical exam.     Reminded the patient that the study team is still blinded to all lipid results until the end of the study and all monitoring of lipids need to be completed by non study team providers.     Drug Injection on left side abdomen at 09:30 by pharmacist.  Box: [id number]; Lot [id number]    PK was done at 10:35 and processed according to protocol.    Patient waited with me the whole time after injection to PK draw.        Follow-up: Week 60: 3/28/2024 @ 09:00    Vitals:    Vitals:    24 0805   BP: 109/70   Pulse: 64   Weight: 54 kg (119 lb)   Height: 1.626 m (5' 4\")         Current Outpatient Medications   Medication Sig Dispense Refill    Evolocumab (REPATHA SURECLICK) 140 MG/ML Solution Auto-injector SubQ injection pen Inject 1 mL under the skin every 14 days. 6 Each 3    olpasiran or PLACEBO (STUDY DRUG) 142 mg/1 mL injection Inject 142 mg under the skin every 12 weeks. Administered in clinic      FLUoxetine (PROZAC) 20 MG Cap Take 20 mg by mouth every day.      ezetimibe (ZETIA) 10 MG Tab Take 10 mg by mouth every day.      Estradiol (ESTRACE PO) Take  by mouth.      Empagliflozin (JARDIANCE) 10 MG Tab Take  by mouth.      niacin SR (NIASPAN) 500 MG Tab CR Take 4 Tablets by mouth every day. " 90 tablet 3    carvedilol (COREG) 6.25 MG TABS Take 1 Tab by mouth 2 times a day, with meals. 180 Each 3    rosuvastatin (CRESTOR) 40 MG tablet Take 1 Tab by mouth every day. 90 Tab 3    buPROPion SR (WELLBUTRIN-SR) 150 MG TB12 Take 300 mg by mouth 2 times a day.      PROGESTERONE 100 mg every day.      aspirin (ASA) 325 MG TABS Take 325 mg by mouth every day.       No current facility-administered medications for this visit.    current meds

## 2024-01-12 NOTE — PROGRESS NOTES
OCEAN(A) STUDY VISIT    Patient here for [facility] follow-up  Denies hospitalization or cardiovascular complaints  She has not missed any of her follow-up appointments    Physical Exam  Vitals reviewed.   Constitutional:       General: She is not in acute distress.     Appearance: She is not diaphoretic.   HENT:      Head: Normocephalic and atraumatic.   Eyes:      General: No scleral icterus.     Conjunctiva/sclera: Conjunctivae normal.   Neck:      Vascular: No carotid bruit.   Cardiovascular:      Rate and Rhythm: Normal rate and regular rhythm.      Heart sounds: Normal heart sounds. No murmur heard.  Pulmonary:      Effort: Pulmonary effort is normal. No respiratory distress.      Breath sounds: Normal breath sounds. No wheezing or rales.   Abdominal:      General: There is no distension.      Tenderness: There is no abdominal tenderness. There is no guarding.   Musculoskeletal:      Right lower leg: No edema.      Left lower leg: No edema.   Skin:     Coloration: Skin is not pale.   Neurological:      General: No focal deficit present.      Mental Status: She is alert and oriented to person, place, and time.      Cranial Nerves: No cranial nerve deficit.      Coordination: Coordination normal.      Gait: Gait is intact. Gait normal.   Psychiatric:         Mood and Affect: Mood and affect normal.         Behavior: Behavior normal.       Patient will be given instructions for follow up visit per protocol    Michael J Bloch, MD      
PRINCIPAL DISCHARGE DIAGNOSIS  Diagnosis: Hodgkin's lymphoma  Assessment and Plan of Treatment: Please report to the ER or call your Dr. if you develop a fever >100.4, develop severe nausea, vomiting, diarrhea, chest pain, shortness of breath, headache.

## 2024-03-28 ENCOUNTER — RESEARCH ENCOUNTER (OUTPATIENT)
Dept: CARDIOLOGY | Facility: MEDICAL CENTER | Age: 64
End: 2024-03-28
Attending: INTERNAL MEDICINE
Payer: OTHER GOVERNMENT

## 2024-03-28 VITALS
WEIGHT: 118 LBS | BODY MASS INDEX: 20.25 KG/M2 | HEART RATE: 73 BPM | SYSTOLIC BLOOD PRESSURE: 89 MMHG | DIASTOLIC BLOOD PRESSURE: 62 MMHG

## 2024-03-28 NOTE — RESEARCH NOTE
Name: Zoraida Hoover   MRN: [id number]  Participant ID:  [id number]  : 1960  Visit Date/Time: 3/28/2024 9:00AM      Study:    [facility]_[id number] - [id number]   Status Consented/Enrolled (2023)   Active Start Date 23   Participant    Coordinator Bella Landaverde; Bella Chaney; Katiuska Leyva   IRB [id number]   NCT [id number]    Michael J Bloch, M.D.       Vitals:    Vitals:    24 0900   BP: (!) 89/62   BP Location: Left arm   Patient Position: Sitting   Pulse: 73   Weight: 53.5 kg (118 lb)     Study Note:     Zoraida came in today for Week 60 visit. I reminded the patient the study team is to remain blinded to all lipid results until the end of the study. All monitoring of lipids need to be completed and reviewed by non study team providers. Study procedures were performed per protocol listed below. Patient doing well no issues to report at this time     Study drug     Injection was given @ 9:05am in Left stomach    assigned pack [id number], Lot # [id number]        Adverse Events:      No AE/SINDHU to report        Meds:     Meds reviewed - No changes                 Follow-up: 2024 @11:20 w/Dr Bloch    Current Outpatient Medications   Medication Sig Dispense Refill    Evolocumab (REPATHA SURECLICK) 140 MG/ML Solution Auto-injector SubQ injection pen Inject 1 mL under the skin every 14 days. (Patient taking differently: Inject 140 mg under the skin every 30 days.) 6 Each 3    olpasiran or PLACEBO (STUDY DRUG) 142 mg/1 mL injection Inject 142 mg under the skin every 12 weeks. Administered in clinic      FLUoxetine (PROZAC) 20 MG Cap Take 20 mg by mouth every day.      ezetimibe (ZETIA) 10 MG Tab Take 10 mg by mouth every day.      Estradiol (ESTRACE PO) Take  by mouth.      Empagliflozin (JARDIANCE) 10 MG Tab Take  by mouth.      niacin SR (NIASPAN) 500 MG Tab CR Take 4 Tablets by mouth every day. 90 tablet 3    carvedilol (COREG) 6.25 MG TABS Take 1 Tab by mouth 2  times a day, with meals. 180 Each 3    rosuvastatin (CRESTOR) 40 MG tablet Take 1 Tab by mouth every day. 90 Tab 3    buPROPion SR (WELLBUTRIN-SR) 150 MG TB12 Take 300 mg by mouth 2 times a day.      PROGESTERONE 100 mg every day.      aspirin (ASA) 325 MG TABS Take 325 mg by mouth every day.       No current facility-administered medications for this visit.    current meds

## 2024-07-16 ENCOUNTER — RESEARCH ENCOUNTER (OUTPATIENT)
Dept: CARDIOLOGY | Facility: MEDICAL CENTER | Age: 64
End: 2024-07-16
Attending: NURSE PRACTITIONER
Payer: OTHER GOVERNMENT

## 2024-07-26 VITALS — BODY MASS INDEX: 20.25 KG/M2 | WEIGHT: 117.95 LBS

## 2024-09-12 ENCOUNTER — OFFICE VISIT (OUTPATIENT)
Facility: MEDICAL CENTER | Age: 64
End: 2024-09-12
Payer: OTHER GOVERNMENT

## 2024-09-12 VITALS
SYSTOLIC BLOOD PRESSURE: 116 MMHG | DIASTOLIC BLOOD PRESSURE: 80 MMHG | HEART RATE: 75 BPM | BODY MASS INDEX: 19.74 KG/M2 | WEIGHT: 115 LBS

## 2024-09-12 DIAGNOSIS — I25.83 CORONARY ARTERY DISEASE DUE TO LIPID RICH PLAQUE: ICD-10-CM

## 2024-09-12 DIAGNOSIS — I25.10 CORONARY ARTERY DISEASE DUE TO LIPID RICH PLAQUE: ICD-10-CM

## 2024-09-12 DIAGNOSIS — E78.5 HYPERLIPIDEMIA: ICD-10-CM

## 2024-09-12 NOTE — RESEARCH NOTE
Name: Zoraida Hoover   MRN: [id number]  Participant ID:  [id number]  : 1960  Visit Date/Time: 2024 9:25 AM      Study:    [id number] - [id number]   Status Consented/Enrolled (2023)   Active Start Date 23   Participant    Coordinator Bella Chaney; Katiuska Leyva; Corina Dykes   [facility] [id number]   [facility] [id number]    Michael J Bloch, M.D.     Study Note:     Zoraida came in today for W84 visit. I reminded the patient the study team is to remain blinded to all lipid results until the end of the study. All monitoring of lipids need to be completed and reviewed by non study team providers. Study procedures were performed per protocol listed below. Meds were reviewed, stopped Repatha on 2024. Patient also mentioned they are taking a new medication Altace 10 MG, QD started /.    Study drug     Box pulled @ 8:36am    Injection was given @ 9:07 in right abdomen.  assigned pack [id number]    Adverse Events:      No AE/SINDHU to report    Vitals:    Vitals:    24 0922   BP: 116/80   BP Location: Right arm   Patient Position: Sitting   Pulse: 75   Weight: 52.2 kg (115 lb)       Meds:  Meds were reviewed.  Current Outpatient Medications   Medication Sig Dispense Refill    Evolocumab (REPATHA SURECLICK) 140 MG/ML Solution Auto-injector SubQ injection pen Inject 1 mL under the skin every 14 days. (Patient taking differently: Inject 140 mg under the skin every 30 days.) 6 Each 3    olpasiran or PLACEBO (STUDY DRUG) 142 mg/1 mL injection Inject 142 mg under the skin every 12 weeks. Administered in clinic      FLUoxetine (PROZAC) 20 MG Cap Take 20 mg by mouth every day.      ezetimibe (ZETIA) 10 MG Tab Take 10 mg by mouth every day.      Estradiol (ESTRACE PO) Take  by mouth.      Empagliflozin (JARDIANCE) 10 MG Tab Take  by mouth.      niacin SR (NIASPAN) 500 MG Tab CR Take 4 Tablets by mouth every day. 90 tablet 3    carvedilol (COREG) 6.25 MG TABS Take 1  Tab by mouth 2 times a day, with meals. 180 Each 3    rosuvastatin (CRESTOR) 40 MG tablet Take 1 Tab by mouth every day. 90 Tab 3    buPROPion SR (WELLBUTRIN-SR) 150 MG TB12 Take 300 mg by mouth 2 times a day.      PROGESTERONE 100 mg every day.      aspirin (ASA) 325 MG TABS Take 325 mg by mouth every day.       No current facility-administered medications for this visit.     Follow-up: 11/26/24 @ 930am

## 2024-11-26 ENCOUNTER — RESEARCH ENCOUNTER (OUTPATIENT)
Dept: VASCULAR LAB | Facility: MEDICAL CENTER | Age: 64
End: 2024-11-26
Attending: INTERNAL MEDICINE
Payer: OTHER GOVERNMENT

## 2024-11-26 ENCOUNTER — OFFICE VISIT (OUTPATIENT)
Dept: VASCULAR LAB | Facility: MEDICAL CENTER | Age: 64
End: 2024-11-26
Attending: INTERNAL MEDICINE

## 2024-11-26 VITALS — DIASTOLIC BLOOD PRESSURE: 79 MMHG | HEART RATE: 65 BPM | SYSTOLIC BLOOD PRESSURE: 106 MMHG

## 2024-11-26 VITALS
SYSTOLIC BLOOD PRESSURE: 106 MMHG | BODY MASS INDEX: 20.94 KG/M2 | DIASTOLIC BLOOD PRESSURE: 79 MMHG | HEART RATE: 65 BPM | WEIGHT: 122 LBS

## 2024-11-26 DIAGNOSIS — E78.5 HYPERLIPIDEMIA, UNSPECIFIED HYPERLIPIDEMIA TYPE: ICD-10-CM

## 2024-11-26 DIAGNOSIS — I25.10 CORONARY ARTERY DISEASE DUE TO LIPID RICH PLAQUE: ICD-10-CM

## 2024-11-26 DIAGNOSIS — E78.41 ELEVATED LIPOPROTEIN(A): ICD-10-CM

## 2024-11-26 DIAGNOSIS — I25.83 CORONARY ARTERY DISEASE DUE TO LIPID RICH PLAQUE: ICD-10-CM

## 2024-11-26 NOTE — RESEARCH NOTE
Name: Zoraida Hoover   MRN: [id number]  Participant ID:  [id number]  : 1960  Visit Date/Time: 2024 11:27 AM      Study:    [facility]_[id number] - [id number]   Status Consented/Enrolled (2023)   Active Start Date 23   Participant    Coordinator Bella Chaney; Katiuska Leyva; oCrina Dykes   [facility] [id number]   [facility] [id number]    Michael J Bloch, M.D.     Study Note:     Zoraida came in today for W96 visit. I reminded the patient the study team is to remain blinded to all lipid results until the end of the study. All monitoring of lipids need to be completed and reviewed by non study team providers. Study procedures were performed per protocol listed below.     See Dr Bloch notes for physical exam.    Staff message sent to PI for review of all data reported at this visit as noted in this source document.    Study drug   Reviewed with PI Dr. Bloch that subject may continue on current dose of IP and can have IP administered.     Injection was given @ 0934 in right abdomen  assigned pack [id number]    Adverse Events:   No AE/SINDHU to report     Labs:            Fasting Labs - drawn @ 0918  Reports will go to PI for signature once received.    Procedures:   EKG: Completed per study protocol. Reviewed and signed by PI  PA: 164  QRS: 65  QT: 436  QTc: 432  Heart Rate: 59      Vitals:  Vitals:    24 1127   BP: 106/79   BP Location: Left arm   Patient Position: Sitting   Pulse: 65     Meds:  Meds reviewed -     Current Outpatient Medications   Medication Sig Dispense Refill    Evolocumab (REPATHA SURECLICK) 140 MG/ML Solution Auto-injector SubQ injection pen Inject 1 mL under the skin every 14 days. (Patient not taking: Reported on 2024) 6 Each 3    olpasiran or PLACEBO (STUDY DRUG) 142 mg/1 mL injection Inject 142 mg under the skin every 12 weeks. Administered in clinic      FLUoxetine (PROZAC) 20 MG Cap Take 20 mg by mouth every day.      ezetimibe (ZETIA) 10 MG Tab  Take 10 mg by mouth every day.      Estradiol (ESTRACE PO) Take  by mouth.      Empagliflozin (JARDIANCE) 10 MG Tab Take  by mouth.      niacin SR (NIASPAN) 500 MG Tab CR Take 4 Tablets by mouth every day. 90 tablet 3    carvedilol (COREG) 6.25 MG TABS Take 1 Tab by mouth 2 times a day, with meals. 180 Each 3    rosuvastatin (CRESTOR) 40 MG tablet Take 1 Tab by mouth every day. 90 Tab 3    buPROPion SR (WELLBUTRIN-SR) 150 MG TB12 Take 300 mg by mouth 2 times a day.      PROGESTERONE 100 mg every day.      aspirin (ASA) 325 MG TABS Take 325 mg by mouth every day.       No current facility-administered medications for this visit.    current meds    Follow-up:  Tuesday 03/11/25 @ 0900

## 2024-11-27 NOTE — PROGRESS NOTES
OCEAN(A) STUDY VISIT    Patient here for follow-up  No cardiovascular complaints  Great exercise tolerance  Tolerating study drug    I remain blinded to her lipid levels      Physical Exam  Vitals reviewed.   Constitutional:       General: She is not in acute distress.     Appearance: She is not diaphoretic.   HENT:      Head: Normocephalic and atraumatic.   Eyes:      General: No scleral icterus.     Conjunctiva/sclera: Conjunctivae normal.   Neck:      Vascular: No carotid bruit.   Cardiovascular:      Rate and Rhythm: Normal rate and regular rhythm.      Heart sounds: Normal heart sounds. No murmur heard.  Pulmonary:      Effort: Pulmonary effort is normal. No respiratory distress.      Breath sounds: Normal breath sounds. No wheezing or rales.   Musculoskeletal:      Right lower leg: No edema.      Left lower leg: No edema.   Skin:     Coloration: Skin is not pale.   Neurological:      General: No focal deficit present.      Mental Status: She is alert and oriented to person, place, and time.      Cranial Nerves: No cranial nerve deficit.      Coordination: Coordination normal.      Gait: Gait is intact. Gait normal.   Psychiatric:         Mood and Affect: Mood and affect normal.         Behavior: Behavior normal.       Patient will be given instructions for follow up visit per protocol    Michael J Bloch, MD

## 2025-03-11 ENCOUNTER — RESEARCH ENCOUNTER (OUTPATIENT)
Facility: MEDICAL CENTER | Age: 65
End: 2025-03-11
Payer: OTHER GOVERNMENT

## 2025-03-11 ENCOUNTER — DOCUMENTATION (OUTPATIENT)
Dept: VASCULAR LAB | Facility: MEDICAL CENTER | Age: 65
End: 2025-03-11

## 2025-03-11 VITALS — SYSTOLIC BLOOD PRESSURE: 93 MMHG | HEART RATE: 80 BPM | DIASTOLIC BLOOD PRESSURE: 70 MMHG

## 2025-03-11 NOTE — RESEARCH NOTE
Name: Zoraida Hoover   MRN: [id number]  Participant ID:  [id number]  : 1960  Visit Date/Time: 3/11/2025 9:31 AM      Study:    [facility]_[id number] - [id number]   Status Consented/Enrolled (2023)   Active Start Date 23   Participant    Coordinator Bella Chaney; Katiuska Leyva; Corina Dykes   [facility] [id number]   [facility] [id number]    Michael J Bloch, M.D.     Study Note:     Zoraida came in today for week 108 visit. I reminded the patient the study team is to remain blinded to all lipid results until the end of the study. All monitoring of lipids need to be completed and reviewed by non study team providers. Study procedures were performed per protocol listed below.     Staff message sent to PI/Sub I for review of all data reported at this visit as noted in this source document.      Study drug   Reviewed with PI/Sub I that subject may continue on current dose of IP and can have IP administered.     Injection was given @ 0915 in Right stomach  assigned pack [id number]    Adverse Events:   No AE/SINDHU to report    Meds:  Meds reviewed -  Repatha has been stopped, I am unable to remove from Epic. Everything else noted is the same per Zoraida.         Follow-up: 25 @ 9 Week 120 (non-fasting lab, research staff only) 2025 @ 9 Week 132 (no lab, research staff only)     Vitals:    Vitals:    25 0910   BP: 93/70   BP Location: Left arm   Patient Position: Sitting   Pulse: 80         Current Outpatient Medications   Medication Sig Dispense Refill    Evolocumab (REPATHA SURECLICK) 140 MG/ML Solution Auto-injector SubQ injection pen Inject 1 mL under the skin every 14 days. (Patient not taking: Reported on 2024) 6 Each 3    olpasiran or PLACEBO (STUDY DRUG) 142 mg/1 mL injection Inject 142 mg under the skin every 12 weeks. Administered in clinic      FLUoxetine (PROZAC) 20 MG Cap Take 20 mg by mouth every day.      ezetimibe (ZETIA) 10 MG Tab Take 10 mg by mouth every  day.      Estradiol (ESTRACE PO) Take  by mouth.      Empagliflozin (JARDIANCE) 10 MG Tab Take  by mouth.      niacin SR (NIASPAN) 500 MG Tab CR Take 4 Tablets by mouth every day. 90 tablet 3    carvedilol (COREG) 6.25 MG TABS Take 1 Tab by mouth 2 times a day, with meals. 180 Each 3    rosuvastatin (CRESTOR) 40 MG tablet Take 1 Tab by mouth every day. 90 Tab 3    buPROPion SR (WELLBUTRIN-SR) 150 MG TB12 Take 300 mg by mouth 2 times a day.      PROGESTERONE 100 mg every day.      aspirin (ASA) 325 MG TABS Take 325 mg by mouth every day.       No current facility-administered medications for this visit.    current meds

## 2025-05-28 ENCOUNTER — RESEARCH ENCOUNTER (OUTPATIENT)
Dept: CARDIOLOGY | Facility: MEDICAL CENTER | Age: 65
End: 2025-05-28
Attending: INTERNAL MEDICINE
Payer: OTHER GOVERNMENT

## 2025-05-28 VITALS — DIASTOLIC BLOOD PRESSURE: 70 MMHG | HEART RATE: 65 BPM | SYSTOLIC BLOOD PRESSURE: 110 MMHG

## 2025-05-28 NOTE — RESEARCH NOTE
Name: Zoraida Hoover   MRN: [id number]  Participant ID:  [id number]  : 1960  Visit Date/Time: 2025 10:31 AM      Study:    [facility]_[id number] - [id number]   Status Consented/Enrolled (2023)   Active Start Date 23   Participant    Coordinator Bella Chaney; Katiuska Leyva; Bella Landaverde; Beatriz Montalvo   [facility] [id number]   [facility] [id number]    Michael J Bloch, M.D.   Study Note:     Zoraida came in today for week 120 visit. I reminded the patient the study team is to remain blinded to all lipid results until the end of the study. All monitoring of lipids need to be completed and reviewed by non study team providers. Study procedures were performed per protocol listed below.    She was given a copy of the ne consent V7 dated . All questions were answered. She was given ample time to review. She signed the consent santi copy was provided to her. No study procedure took place before the consent was signed.    Staff message sent to PI for review of all data reported at this visit as noted in this source document.      Study drug   Reviewed with PI that subject may continue on current dose of IP and can have IP administered.     Injection was given @ 08:19 in abdomen  assigned pack [id number]    Adverse Events:   No AE/SINDHU to report     Labs:            Labs - drawn @  08:15  Reports will go to PI for signature once received.    Meds:  Meds reviewed -  Niacin 1000mg M-W-F started 2025        Vitals:    Vitals:    25 1030   BP: 110/70   Patient Position: Sitting   Pulse: 65         Follow-up: 2025 and 2025

## 2025-05-28 NOTE — PROGRESS NOTES
Agree with research coordinator note as above  Patient should continue to receive study drug provide alcohol    Michael J Bloch, MD  Certified Clinical Lipid Specialist  Medial Director, [facility]

## 2025-08-20 ENCOUNTER — DOCUMENTATION (OUTPATIENT)
Dept: VASCULAR LAB | Facility: MEDICAL CENTER | Age: 65
End: 2025-08-20

## 2025-08-20 ENCOUNTER — RESEARCH ENCOUNTER (OUTPATIENT)
Dept: CARDIOLOGY | Facility: MEDICAL CENTER | Age: 65
End: 2025-08-20
Attending: INTERNAL MEDICINE
Payer: OTHER GOVERNMENT